# Patient Record
Sex: FEMALE | Race: WHITE | NOT HISPANIC OR LATINO | URBAN - METROPOLITAN AREA
[De-identification: names, ages, dates, MRNs, and addresses within clinical notes are randomized per-mention and may not be internally consistent; named-entity substitution may affect disease eponyms.]

---

## 2017-02-27 ENCOUNTER — IMPORTED ENCOUNTER (OUTPATIENT)
Dept: URBAN - METROPOLITAN AREA CLINIC 31 | Facility: CLINIC | Age: 72
End: 2017-02-27

## 2017-02-27 PROBLEM — H21.233: Noted: 2017-02-27

## 2017-02-27 PROBLEM — H35.3131: Noted: 2017-02-27

## 2017-02-27 PROBLEM — H25.11: Noted: 2017-02-27

## 2017-02-27 PROBLEM — H40.003: Noted: 2017-02-27

## 2017-02-27 PROBLEM — H17.823: Noted: 2017-02-27

## 2017-02-27 PROBLEM — H25.12: Noted: 2017-02-27

## 2017-02-27 PROCEDURE — 92015 DETERMINE REFRACTIVE STATE: CPT

## 2017-02-27 PROCEDURE — 92083 EXTENDED VISUAL FIELD XM: CPT

## 2017-02-27 PROCEDURE — 99214 OFFICE O/P EST MOD 30 MIN: CPT

## 2017-02-27 NOTE — PATIENT DISCUSSION
1.  Glaucoma suspect OU - No signs of glaucomatous damage to the optic nerve based on todays examination and testing. Will continue to monitor. VF in a year. 2. Nuclear Sclerotic Cataract OD:  Cat Consult with Dr. Viet Boo. PORE plano. Will use OS to read. Patient understands difficulty getting PORE correct due to prior corneal procedures. 3. Nuclear Sclerotic Cataract OS: Explained how cataracts can effect vision. Recommend clinical observation. The patient was advised to contact us if any change or worsening of vision. 4. Pigment Dispersion Syndrome OU  5. Peripherial Corneal Scar OU - 6. Refractive error Single vision distance Rx until Cat Sx OD. 7.  ARMD OU dry - Importance of smoking cessation blood pressure control and healthy diet were emphasized. In accordance with the AREDS study a good multivitamin containing EC and Zinc were recommened to be taken daily. Patient was instructed to self monitor their monocular vision (reading/Amsler Grid) at least weekly. Patient should immediately report any new onset of decreased vision or metamorphopsia.

## 2018-03-01 ENCOUNTER — IMPORTED ENCOUNTER (OUTPATIENT)
Dept: URBAN - METROPOLITAN AREA CLINIC 31 | Facility: CLINIC | Age: 73
End: 2018-03-01

## 2018-03-01 PROBLEM — H40.003: Noted: 2018-03-01

## 2018-03-01 PROBLEM — H35.3131: Noted: 2018-03-01

## 2018-03-01 PROCEDURE — 92250 FUNDUS PHOTOGRAPHY W/I&R: CPT

## 2018-03-01 PROCEDURE — 92015 DETERMINE REFRACTIVE STATE: CPT

## 2018-03-01 PROCEDURE — 92014 COMPRE OPH EXAM EST PT 1/>: CPT

## 2018-03-01 NOTE — PATIENT DISCUSSION
Glaucoma suspect OU - No signs of glaucomatous damage to the optic nerve based on todays examination and testing. Will continue to monitor.

## 2019-02-20 ENCOUNTER — IMPORTED ENCOUNTER (OUTPATIENT)
Dept: URBAN - METROPOLITAN AREA CLINIC 31 | Facility: CLINIC | Age: 74
End: 2019-02-20

## 2019-02-20 PROBLEM — H25.813: Noted: 2019-02-20

## 2019-02-20 PROBLEM — H40.013: Noted: 2019-02-20

## 2019-02-20 PROBLEM — H21.233: Noted: 2019-02-20

## 2019-02-20 PROBLEM — H35.3131: Noted: 2019-02-20

## 2019-02-20 PROCEDURE — 92250 FUNDUS PHOTOGRAPHY W/I&R: CPT

## 2019-02-20 PROCEDURE — 92014 COMPRE OPH EXAM EST PT 1/>: CPT

## 2019-02-20 PROCEDURE — 92015 DETERMINE REFRACTIVE STATE: CPT

## 2019-02-20 NOTE — PATIENT DISCUSSION
1.  ARMD OU dry -Patient should immediately report any new onset of decreased vision or metamorphopsia. 2. Glaucoma suspect OU - No signs of glaucomatous damage to the optic nerve based on todays examination and testing. Will continue to monitor. 3. Pigment Dispersion Syndrome OU - 4. Cat Eval with Dr. Pablo Ignacio. Patient understands will need ORA and Monovision not a good idea with corneal condition and early ARMD.  PORE plano OU. Patient understands may need toric IOL and if so will be a surcharge. Patient has elected to be optimized for distance vision in both eyes. The patient will still need glasses for reading and to possibly fine tune distance vision.

## 2019-05-01 ENCOUNTER — IMPORTED ENCOUNTER (OUTPATIENT)
Dept: URBAN - METROPOLITAN AREA CLINIC 31 | Facility: CLINIC | Age: 74
End: 2019-05-01

## 2019-05-01 PROBLEM — H17.89: Noted: 2019-05-01

## 2019-05-01 PROBLEM — H35.3132: Noted: 2019-05-01

## 2019-05-01 PROBLEM — H25.811: Noted: 2019-05-01

## 2019-05-01 PROBLEM — H25.813: Noted: 2019-05-01

## 2019-05-01 PROBLEM — H40.013: Noted: 2019-05-01

## 2019-05-01 PROCEDURE — 92025 CPTRIZED CORNEAL TOPOGRAPHY: CPT

## 2019-05-01 PROCEDURE — 99214 OFFICE O/P EST MOD 30 MIN: CPT

## 2019-05-01 NOTE — PATIENT DISCUSSION
1.  Discussed the risks benefits alternatives and limitations of cataract surgery including infection bleeding loss of vision retinal tears detachment. The patient stated a full understanding and a desire to proceed with the procedure in both eyes. Refractive options were reviewed. Options reviewed Distance ou reading ou monovision which she has done in past. OS is dominant. Schedule KPE/IOL OD/OS would wait 2 weeks between eyes because of h/o RK OD will take longer for refraction to stabilize. With AMD would recommend both eyes stay the same power. Pt would prefer to remain nearsighted. PORE -2.50 OD -2.00 OS2. Pt has a history of prior RK and understands that IOL calculations are more difficult. Cannot guarantee refractive result and pt may need glasses CL or enhancement surgery to fine tune the results. 3. Glaucoma suspect OU - No signs of glaucomatous damage to the optic nerve based on todays examination and testing. Will continue to monitor. 4.  s/p RK- OD refractive shift discussed. Monitor5.  s/p CRI/LRI OS6. ARMD OU dry - Importance of smoking cessation blood pressure control and healthy diet were emphasized. In accordance with the AREDS study a good multivitamin containing EC and Zinc were recommened to be taken daily. Patient was instructed to self monitor their monocular vision (reading/Amsler Grid) at least weekly. Patient should immediately report any new onset of decreased vision or metamorphopsia. 7. Return for an appointment for [address] [place] [address] with Dr. Flavio Frankel.

## 2019-05-01 NOTE — PATIENT DISCUSSION
Pt has a history of prior RK and understands that IOL calculations are more difficult. Cannot guarantee refractive result and pt may need glasses CL or enhancement surgery to fine tune the results.

## 2019-05-10 ENCOUNTER — IMPORTED ENCOUNTER (OUTPATIENT)
Dept: URBAN - METROPOLITAN AREA CLINIC 31 | Facility: CLINIC | Age: 74
End: 2019-05-10

## 2019-05-10 PROBLEM — H25.813: Noted: 2019-05-10

## 2019-05-10 PROCEDURE — 92025 CPTRIZED CORNEAL TOPOGRAPHY: CPT

## 2019-05-10 PROCEDURE — 76519 ECHO EXAM OF EYE: CPT

## 2019-05-10 NOTE — PATIENT DISCUSSION
Discussed the risks benefits alternatives and limitations of cataract surgery including infection bleeding loss of vision retinal tears detachment. The patient stated a full understanding and a desire to proceed with the procedure in both eyes. Refractive options were reviewed. Patient has elected to be optimized for near vision in both eyes. The patient will still need glasses for distance. Pt understands IOL calcs are more difficult after prior RK and Lasik and possible need for IOL exchange Jocelyn Wiggins for best vision.    Because of AMD best to keep eyes the same power and not do monovision

## 2019-05-21 ENCOUNTER — IMPORTED ENCOUNTER (OUTPATIENT)
Dept: URBAN - METROPOLITAN AREA CLINIC 31 | Facility: CLINIC | Age: 74
End: 2019-05-21

## 2019-05-21 PROBLEM — Z96.1: Noted: 2019-05-21

## 2019-05-21 PROCEDURE — 99024 POSTOP FOLLOW-UP VISIT: CPT

## 2019-05-21 NOTE — PATIENT DISCUSSION
Post-Op Day #1 - Cataract Surgery Right Eye (OD) - doing well. Tears prn. Continue postop drops as directed. Call office with symptoms of pain redness or decreased vision in operative eye. 1 drop zylet instilled. Reiterated to patient that both eyes are being powered for near and will need glasses for distance. Patient stated she didn't want glasses for distance only for near. I re-read what was said at admit when she stated she would rather remain nearsighted and she said nearsighted means you can see far away. I told her no that nearsighted means you see near and you wear glasses for distance. She said she didn't want glasses for distance just for reading. I told her we will measure the eye power in a week and see how she ended up before we talk about altering the power if need be. She understood and was happy that we could alter it if we needed to.  LA

## 2019-05-29 ENCOUNTER — IMPORTED ENCOUNTER (OUTPATIENT)
Dept: URBAN - METROPOLITAN AREA CLINIC 31 | Facility: CLINIC | Age: 74
End: 2019-05-29

## 2019-05-29 PROBLEM — Z96.1: Noted: 2019-05-29

## 2019-05-29 PROCEDURE — 99024 POSTOP FOLLOW-UP VISIT: CPT

## 2019-05-29 NOTE — PATIENT DISCUSSION
Post-Op Week #1 - Cataract Surgery Right Eye (OD) - Intraocular lens stable and surgery very well healed. Patient to resume all normal activities. Finish post op drops as directed. Right eye is powered for near. Patient states she wants eye powered for distance. She states her right eye was her distance eye in monovision before. She would like to not have any Sx OS presently as she reads with it fine and with glasses she is getting by for driving and TV. She wants the right eye for distance for general non-critical vision. I explained to her that even with correcting the right eye for distance that since that eye has had prior RK LASIK and presently has a small amount of ARMD that 20/40 might be the best vision we can get. She stated she understands and would put her glasses on to invoke her left eye for distance for critical tasks. Appointment made with Dr. Sandi Martinez for po evaluation of options OD. I told her there are generally three options to change the power OD IOL exchange Piggyback and corneal LVC. I told her only Dr. Sandi Martinez should decide which method would be best suited for her situation as the right eye has had prior refractive surgery. She stated she undersands.   LA

## 2019-06-04 ENCOUNTER — IMPORTED ENCOUNTER (OUTPATIENT)
Dept: URBAN - METROPOLITAN AREA CLINIC 31 | Facility: CLINIC | Age: 74
End: 2019-06-04

## 2019-06-04 PROBLEM — Z96.1: Noted: 2019-06-04

## 2019-06-04 PROCEDURE — 99024 POSTOP FOLLOW-UP VISIT: CPT

## 2019-06-04 NOTE — PATIENT DISCUSSION
Post-Op Cataract Surgery 15-90 days Right Eye (OD):  Pt unhappy that she cannot see distance because pt states she misunderstood that leaving the eyes for reading meant needing distance glasses. .  Pt has AMD and is only correctable to 20/40. Pt Cannot do monovision will not work with AMD and needs both eyes Near or both eyes distance. Stressed RK effect is eyes set for distance may lose uncorrected vision over time. Recommend distance CL trial before exchange if happy with vision then plan IOL exchange OD risks and benefits reviewed. Would then need OS set for distance and reading glasses.  Recheck MRX in 2 weeks before exchange

## 2019-06-05 ENCOUNTER — IMPORTED ENCOUNTER (OUTPATIENT)
Dept: URBAN - METROPOLITAN AREA CLINIC 31 | Facility: CLINIC | Age: 74
End: 2019-06-05

## 2019-06-05 PROCEDURE — 92310 CONTACT LENS FITTING OU: CPT

## 2019-06-05 NOTE — PATIENT DISCUSSION
Dispense trial OD. To DC and call if any problems. Pt agrees that vision with cl is better than not having any correction OD. She likes her glasses however will try the contact lens for a few days and return in 1 week and let us know if she would like to proceed with IOL exchange.

## 2019-06-12 ENCOUNTER — IMPORTED ENCOUNTER (OUTPATIENT)
Dept: URBAN - METROPOLITAN AREA CLINIC 31 | Facility: CLINIC | Age: 74
End: 2019-06-12

## 2019-06-12 PROBLEM — Z96.1: Noted: 2019-06-12

## 2019-06-12 PROBLEM — H25.13: Noted: 2019-06-12

## 2019-06-12 PROCEDURE — 99024 POSTOP FOLLOW-UP VISIT: CPT

## 2019-06-12 NOTE — PATIENT DISCUSSION
Pseudophakia OU - IOLs stable. Monitor. Dispense Trial of Air Optix lens to pt to see if it will provide more comfort.

## 2019-06-12 NOTE — PATIENT DISCUSSION
1.  Nuclear Sclerotic Cataract OU: Explained how cataracts can effect vision. Recommend clinical observation. The patient was advised to contact us if any change or worsening of vision. 2. Pseudophakia OD - IOL stable. Monitor. 3. Patient happy with vision the way it is for near and will wear her glasses for distance. Copy of present Rx given.   F/U 6 mos CE.

## 2019-09-10 ENCOUNTER — IMPORTED ENCOUNTER (OUTPATIENT)
Dept: URBAN - METROPOLITAN AREA CLINIC 31 | Facility: CLINIC | Age: 74
End: 2019-09-10

## 2019-09-10 PROBLEM — H25.812: Noted: 2019-09-10

## 2019-09-10 PROBLEM — Z96.1: Noted: 2019-09-10

## 2019-09-10 PROBLEM — H35.3131: Noted: 2019-09-10

## 2019-09-10 PROBLEM — H40.013: Noted: 2019-09-10

## 2019-09-10 PROCEDURE — 92015 DETERMINE REFRACTIVE STATE: CPT

## 2019-09-10 PROCEDURE — 99213 OFFICE O/P EST LOW 20 MIN: CPT

## 2019-09-10 NOTE — PATIENT DISCUSSION
1.  ARMD OU dry -  Grid) at least weekly. Patient should immediately report any new onset of decreased vision or metamorphopsia. 2. Pseudophakia OD - IOL stable. Monitor. 3. Combined Types of Cataract OS: Explained how cataracts can effect vision. Recommend clinical observation. The patient was advised to contact us if any change or worsening of vision. 4. Glaucoma suspect OU - No signs of glaucomatous damage to the optic nerve based on today's examination and testing. Will continue to monitor. 5. Rx SV distance remove for near.

## 2019-09-10 NOTE — PATIENT DISCUSSION
Combined Types of Cataract OS: Explained how cataracts can effect vision. Recommend clinical observation. The patient was advised to contact us if any change or worsening of vision.

## 2019-12-12 ENCOUNTER — IMPORTED ENCOUNTER (OUTPATIENT)
Dept: URBAN - METROPOLITAN AREA CLINIC 31 | Facility: CLINIC | Age: 74
End: 2019-12-12

## 2019-12-12 PROBLEM — Z96.1: Noted: 2019-12-12

## 2019-12-12 PROBLEM — H40.013: Noted: 2019-12-12

## 2019-12-12 PROBLEM — H25.12: Noted: 2019-12-12

## 2019-12-12 PROBLEM — H35.3132: Noted: 2019-12-12

## 2019-12-12 PROCEDURE — 99213 OFFICE O/P EST LOW 20 MIN: CPT

## 2019-12-12 NOTE — PATIENT DISCUSSION
1.  Refractive error Continue present contact lens modality. Stop/wear and call if any redness pain or decrease in vision occur. 2.  ARMD OD/OS dry? Eval with Dr. Rosalinda Garcia. 3. Nuclear Sclerotic Cataract OS: Explained how cataracts can effect vision. Recommend clinical observation. The patient was advised to contact us if any change or worsening of vision. 4. Pseudophakia OD - IOL stable. Monitor for changes in vision. 5.  Glaucoma suspect OU - Normal IOP with cupping. [de-identified] year old VF full OU. Get OCT and repeat VF OU.

## 2019-12-12 NOTE — PATIENT DISCUSSION
Glaucoma suspect OU - No signs of glaucoma starting based on todays examination and testing. Will continue to monitor for glaucoma development.

## 2020-08-24 ENCOUNTER — IMPORTED ENCOUNTER (OUTPATIENT)
Dept: URBAN - METROPOLITAN AREA CLINIC 31 | Facility: CLINIC | Age: 75
End: 2020-08-24

## 2020-08-24 PROBLEM — Z96.1: Noted: 2020-08-24

## 2020-08-24 PROBLEM — H35.3131: Noted: 2020-08-24

## 2020-08-24 PROBLEM — H25.12: Noted: 2020-08-24

## 2020-08-24 PROCEDURE — 92250 FUNDUS PHOTOGRAPHY W/I&R: CPT

## 2020-08-24 PROCEDURE — 99214 OFFICE O/P EST MOD 30 MIN: CPT

## 2020-08-24 NOTE — PATIENT DISCUSSION
1.  ARMD OU dry - Retina consult2. Pseudophakia OD - IOL stable. Monitor for changes in vision. 3. Nuclear Sclerotic Cataract OS: Cat Sx consult. OS PORE -2.50 to -3.00 range.

## 2020-09-10 ENCOUNTER — TELEPHONE ENCOUNTER (OUTPATIENT)
Dept: URBAN - METROPOLITAN AREA CLINIC 9 | Facility: CLINIC | Age: 75
End: 2020-09-10

## 2020-09-23 ENCOUNTER — IMPORTED ENCOUNTER (OUTPATIENT)
Dept: URBAN - METROPOLITAN AREA CLINIC 31 | Facility: CLINIC | Age: 75
End: 2020-09-23

## 2020-09-23 PROBLEM — H35.3132: Noted: 2020-09-23

## 2020-09-23 PROBLEM — H17.89: Noted: 2020-09-23

## 2020-09-23 PROBLEM — H26.491: Noted: 2020-09-23

## 2020-09-23 PROBLEM — H25.812: Noted: 2020-09-23

## 2020-09-23 PROBLEM — H40.013: Noted: 2020-09-23

## 2020-09-23 PROCEDURE — 99213 OFFICE O/P EST LOW 20 MIN: CPT

## 2020-09-23 NOTE — PATIENT DISCUSSION
ARMD OU dry - Importance of smoking cessation blood pressure control and healthy diet were emphasized. In accordance with the AREDS study a good multivitamin containing EC and Zinc were recommened to be taken daily. Patient was instructed to self monitor their monocular vision (reading/Amsler Grid) at least weekly. Patient should immediately report any new onset of decreased vision or metamorphopsia. Saw Dr Rufus Ayon recently

## 2020-09-23 NOTE — PATIENT DISCUSSION
1.  Cataract OS: Discussed the risks benefits alternatives and limitations of cataract surgery including infection bleeding loss of vision retinal tears detachment. The patient stated a full understanding and a desire to proceed with the procedure in the left eye. Refractive options were reviewed. Patient has elected to be optimized for near vision in the left eye. The patient will need glasses for distance. Schedule KPE/IOL OS Pore -2.50 paperwork only. Final vision will be limited by the AMD2. PCO  OD (Posterior Capsule Opacification)  Not visually significant at this time. Monitor for yag capsulotomy necessity. 3. ARMD OU dry - Importance of smoking cessation blood pressure control and healthy diet were emphasized. In accordance with the AREDS study a good multivitamin containing EC and Zinc were recommened to be taken daily. Patient was instructed to self monitor their monocular vision (reading/Amsler Grid) at least weekly. Patient should immediately report any new onset of decreased vision or metamorphopsia. Saw Dr Dejuan Linares recently4.  s/p RK- refractive shift discussed. Monitor5.  s/p CRI/LRI6. Glaucoma suspect OU - No signs of glaucoma starting based on todays examination and testing. Will continue to monitor for glaucoma development.

## 2020-10-27 ENCOUNTER — IMPORTED ENCOUNTER (OUTPATIENT)
Dept: URBAN - METROPOLITAN AREA CLINIC 31 | Facility: CLINIC | Age: 75
End: 2020-10-27

## 2022-04-02 ASSESSMENT — VISUAL ACUITY
OD_CC: J214''
OS_SC: 20/25+2
OS_PH: CC 20/40
OS_CC: 20/200
OS_SC: 20/70+1
OS_GLARE: 20/70
OS_CC: 20/200
OD_SC: 20/30
OD_PH: CC 20/50 +2
OD_CC: 20/400
OD_SC: J213''
OS_SC: J113''
OD_CC: 20/400
OD_SC: J1-2
OD_SC: J514''
OS_CC: 20/200
OS_SC: J1
OD_SC: J3
OS_SC: J1
OD_PH: CC 20/40 -2
OS_CC: J114''
OS_SC: J2
OD_GLARE: 20/400
OS_GLARE: 20/400MED
OD_PH: SC 20/50 +1
OD_SC: 20/70+2
OD_SC: 20/40-1
OD_SC: J1
OD_SC: 20/60-1
OS_PH: CC 20/40 -1
OS_SC: 20/40+2
OS_SC: 20/30
OD_SC: 20/80
OD_SC: 20/40
OD_SC: 20/50+2
OS_SC: 20/30+2
OS_SC: 20/50+1
OD_SC: 20/50-3
OS_SC: J214''
OD_CC: 20/400
OD_SC: 20/50-2
OD_PH: SC 20/70
OS_SC: 20/70-1
OD_PH: CC 20/60
OS_SC: 20/25-3
OD_CC: 20/400
OD_SC: J2
OS_SC: 20/30+3

## 2022-04-02 ASSESSMENT — TONOMETRY
OS_IOP_MMHG: 12
OD_IOP_MMHG: 15
OD_IOP_MMHG: 12
OS_IOP_MMHG: 11
OD_IOP_MMHG: 12
OS_IOP_MMHG: 15
OD_IOP_MMHG: 13
OD_IOP_MMHG: 15
OS_IOP_MMHG: 14
OD_IOP_MMHG: 11
OD_IOP_MMHG: 13
OS_IOP_MMHG: 12
OS_IOP_MMHG: 13
OD_IOP_MMHG: 6
OD_IOP_MMHG: 14
OS_IOP_MMHG: 13
OS_IOP_MMHG: 13
OD_IOP_MMHG: 16
OD_IOP_MMHG: 13
OS_IOP_MMHG: 16
OD_IOP_MMHG: 12

## 2022-07-30 ENCOUNTER — TELEPHONE ENCOUNTER (OUTPATIENT)
Age: 77
End: 2022-07-30

## 2022-07-31 ENCOUNTER — TELEPHONE ENCOUNTER (OUTPATIENT)
Age: 77
End: 2022-07-31